# Patient Record
Sex: MALE | Race: WHITE | NOT HISPANIC OR LATINO | Employment: OTHER | RURAL
[De-identification: names, ages, dates, MRNs, and addresses within clinical notes are randomized per-mention and may not be internally consistent; named-entity substitution may affect disease eponyms.]

---

## 2020-10-01 ENCOUNTER — HISTORICAL (OUTPATIENT)
Dept: ADMINISTRATIVE | Facility: HOSPITAL | Age: 70
End: 2020-10-01

## 2020-10-02 LAB
ALT SERPL W P-5'-P-CCNC: 36 U/L (ref 16–61)
CHOLEST SERPL-MCNC: 175 MG/DL
CHOLEST/HDLC SERPL: 6 {RATIO}
HDLC SERPL-MCNC: 29 MG/DL
LDLC SERPL CALC-MCNC: 108 MG/DL
TRIGL SERPL-MCNC: 188 MG/DL

## 2021-03-16 RX ORDER — ATORVASTATIN CALCIUM 10 MG/1
10 TABLET, FILM COATED ORAL DAILY
COMMUNITY
End: 2021-12-07 | Stop reason: DRUGHIGH

## 2021-03-16 RX ORDER — LISINOPRIL AND HYDROCHLOROTHIAZIDE 12.5; 2 MG/1; MG/1
1 TABLET ORAL DAILY
COMMUNITY
End: 2022-04-19 | Stop reason: SDUPTHER

## 2021-03-16 RX ORDER — ASPIRIN 325 MG
325 TABLET, DELAYED RELEASE (ENTERIC COATED) ORAL DAILY
COMMUNITY

## 2021-03-16 RX ORDER — SERTRALINE HYDROCHLORIDE 100 MG/1
100 TABLET, FILM COATED ORAL DAILY
COMMUNITY
End: 2023-06-05 | Stop reason: SDUPTHER

## 2021-03-16 RX ORDER — HYDROXYZINE HYDROCHLORIDE 25 MG/1
25 TABLET, FILM COATED ORAL EVERY 8 HOURS PRN
COMMUNITY
End: 2023-06-05 | Stop reason: SDUPTHER

## 2021-03-16 RX ORDER — TERBINAFINE HYDROCHLORIDE 250 MG/1
250 TABLET ORAL DAILY
COMMUNITY

## 2021-03-30 ENCOUNTER — OFFICE VISIT (OUTPATIENT)
Dept: CARDIOLOGY | Facility: CLINIC | Age: 71
End: 2021-03-30
Payer: MEDICARE

## 2021-03-30 VITALS
DIASTOLIC BLOOD PRESSURE: 64 MMHG | HEIGHT: 66 IN | RESPIRATION RATE: 20 BRPM | WEIGHT: 203 LBS | BODY MASS INDEX: 32.62 KG/M2 | HEART RATE: 70 BPM | SYSTOLIC BLOOD PRESSURE: 118 MMHG

## 2021-03-30 DIAGNOSIS — J44.9 CHRONIC OBSTRUCTIVE PULMONARY DISEASE, UNSPECIFIED COPD TYPE: ICD-10-CM

## 2021-03-30 DIAGNOSIS — I48.91 ATRIAL FIBRILLATION, UNSPECIFIED TYPE: Primary | ICD-10-CM

## 2021-03-30 DIAGNOSIS — I25.10 CORONARY ARTERY DISEASE INVOLVING NATIVE HEART WITHOUT ANGINA PECTORIS, UNSPECIFIED VESSEL OR LESION TYPE: ICD-10-CM

## 2021-03-30 PROCEDURE — 93010 EKG 12-LEAD: ICD-10-PCS | Mod: S$PBB,,, | Performed by: INTERNAL MEDICINE

## 2021-03-30 PROCEDURE — 99214 OFFICE O/P EST MOD 30 MIN: CPT | Mod: PBBFAC,25 | Performed by: INTERNAL MEDICINE

## 2021-03-30 PROCEDURE — 99999 PR PBB SHADOW E&M-EST. PATIENT-LVL IV: CPT | Mod: PBBFAC,,, | Performed by: INTERNAL MEDICINE

## 2021-03-30 PROCEDURE — 99214 OFFICE O/P EST MOD 30 MIN: CPT | Mod: S$PBB,,, | Performed by: INTERNAL MEDICINE

## 2021-03-30 PROCEDURE — 99214 PR OFFICE/OUTPT VISIT, EST, LEVL IV, 30-39 MIN: ICD-10-PCS | Mod: S$PBB,,, | Performed by: INTERNAL MEDICINE

## 2021-03-30 PROCEDURE — 93010 ELECTROCARDIOGRAM REPORT: CPT | Mod: S$PBB,,, | Performed by: INTERNAL MEDICINE

## 2021-03-30 PROCEDURE — 99999 PR PBB SHADOW E&M-EST. PATIENT-LVL IV: ICD-10-PCS | Mod: PBBFAC,,, | Performed by: INTERNAL MEDICINE

## 2021-03-30 PROCEDURE — 93005 ELECTROCARDIOGRAM TRACING: CPT | Mod: PBBFAC | Performed by: INTERNAL MEDICINE

## 2021-03-31 PROBLEM — I25.10 CORONARY ARTERY DISEASE INVOLVING NATIVE HEART WITHOUT ANGINA PECTORIS: Status: ACTIVE | Noted: 2021-03-31

## 2021-03-31 PROBLEM — J44.9 CHRONIC OBSTRUCTIVE PULMONARY DISEASE: Status: ACTIVE | Noted: 2021-03-31

## 2021-03-31 PROBLEM — I48.91 ATRIAL FIBRILLATION: Status: ACTIVE | Noted: 2021-03-31

## 2021-10-25 ENCOUNTER — OFFICE VISIT (OUTPATIENT)
Dept: CARDIOLOGY | Facility: CLINIC | Age: 71
End: 2021-10-25
Payer: MEDICARE

## 2021-10-25 VITALS
SYSTOLIC BLOOD PRESSURE: 142 MMHG | HEIGHT: 66 IN | HEART RATE: 64 BPM | RESPIRATION RATE: 18 BRPM | BODY MASS INDEX: 31.34 KG/M2 | DIASTOLIC BLOOD PRESSURE: 80 MMHG | WEIGHT: 195 LBS

## 2021-10-25 DIAGNOSIS — F17.200 SMOKER: ICD-10-CM

## 2021-10-25 DIAGNOSIS — I48.0 PAROXYSMAL ATRIAL FIBRILLATION: Primary | ICD-10-CM

## 2021-10-25 DIAGNOSIS — I25.10 CORONARY ARTERY DISEASE INVOLVING NATIVE HEART WITHOUT ANGINA PECTORIS, UNSPECIFIED VESSEL OR LESION TYPE: ICD-10-CM

## 2021-10-25 DIAGNOSIS — I10 HYPERTENSION, ESSENTIAL: ICD-10-CM

## 2021-10-25 PROCEDURE — 99213 OFFICE O/P EST LOW 20 MIN: CPT | Mod: PBBFAC | Performed by: INTERNAL MEDICINE

## 2021-10-25 PROCEDURE — 99214 OFFICE O/P EST MOD 30 MIN: CPT | Mod: S$PBB,,, | Performed by: INTERNAL MEDICINE

## 2021-10-25 PROCEDURE — 99214 PR OFFICE/OUTPT VISIT, EST, LEVL IV, 30-39 MIN: ICD-10-PCS | Mod: S$PBB,,, | Performed by: INTERNAL MEDICINE

## 2021-10-25 PROCEDURE — 93005 ELECTROCARDIOGRAM TRACING: CPT | Mod: PBBFAC | Performed by: INTERNAL MEDICINE

## 2021-10-25 PROCEDURE — 93010 EKG 12-LEAD: ICD-10-PCS | Mod: S$PBB,,, | Performed by: INTERNAL MEDICINE

## 2021-10-25 PROCEDURE — 93010 ELECTROCARDIOGRAM REPORT: CPT | Mod: S$PBB,,, | Performed by: INTERNAL MEDICINE

## 2021-10-26 PROBLEM — F17.200 SMOKER: Status: ACTIVE | Noted: 2021-10-26

## 2021-10-26 PROBLEM — I10 HYPERTENSION, ESSENTIAL: Status: ACTIVE | Noted: 2021-10-26

## 2021-12-07 RX ORDER — ATORVASTATIN CALCIUM 20 MG/1
20 TABLET, FILM COATED ORAL DAILY
Qty: 90 TABLET | Refills: 3 | Status: SHIPPED | OUTPATIENT
Start: 2021-12-07 | End: 2023-12-14

## 2022-02-07 DIAGNOSIS — E78.5 HYPERLIPIDEMIA, UNSPECIFIED HYPERLIPIDEMIA TYPE: Primary | ICD-10-CM

## 2022-02-07 DIAGNOSIS — Z79.899 ENCOUNTER FOR LONG-TERM (CURRENT) USE OF OTHER MEDICATIONS: ICD-10-CM

## 2022-04-19 ENCOUNTER — OFFICE VISIT (OUTPATIENT)
Dept: CARDIOLOGY | Facility: CLINIC | Age: 72
End: 2022-04-19
Payer: MEDICARE

## 2022-04-19 VITALS
SYSTOLIC BLOOD PRESSURE: 126 MMHG | RESPIRATION RATE: 20 BRPM | HEART RATE: 70 BPM | DIASTOLIC BLOOD PRESSURE: 82 MMHG | WEIGHT: 187.5 LBS | BODY MASS INDEX: 28.42 KG/M2 | HEIGHT: 68 IN

## 2022-04-19 DIAGNOSIS — I25.10 CORONARY ARTERY DISEASE INVOLVING NATIVE CORONARY ARTERY OF NATIVE HEART WITHOUT ANGINA PECTORIS: Chronic | ICD-10-CM

## 2022-04-19 DIAGNOSIS — F41.9 ANXIETY: Chronic | ICD-10-CM

## 2022-04-19 DIAGNOSIS — I10 HYPERTENSION, ESSENTIAL: Chronic | ICD-10-CM

## 2022-04-19 DIAGNOSIS — J44.9 CHRONIC OBSTRUCTIVE PULMONARY DISEASE, UNSPECIFIED COPD TYPE: Chronic | ICD-10-CM

## 2022-04-19 DIAGNOSIS — I48.0 PAROXYSMAL ATRIAL FIBRILLATION: Primary | Chronic | ICD-10-CM

## 2022-04-19 PROCEDURE — 93010 EKG 12-LEAD: ICD-10-PCS | Mod: S$PBB,,, | Performed by: INTERNAL MEDICINE

## 2022-04-19 PROCEDURE — 93010 ELECTROCARDIOGRAM REPORT: CPT | Mod: S$PBB,,, | Performed by: INTERNAL MEDICINE

## 2022-04-19 PROCEDURE — 99214 OFFICE O/P EST MOD 30 MIN: CPT | Mod: S$PBB,,, | Performed by: INTERNAL MEDICINE

## 2022-04-19 PROCEDURE — 93005 ELECTROCARDIOGRAM TRACING: CPT | Mod: PBBFAC | Performed by: INTERNAL MEDICINE

## 2022-04-19 PROCEDURE — 99213 OFFICE O/P EST LOW 20 MIN: CPT | Mod: PBBFAC | Performed by: INTERNAL MEDICINE

## 2022-04-19 PROCEDURE — 99214 PR OFFICE/OUTPT VISIT, EST, LEVL IV, 30-39 MIN: ICD-10-PCS | Mod: S$PBB,,, | Performed by: INTERNAL MEDICINE

## 2022-04-19 RX ORDER — NITROGLYCERIN 0.4 MG/1
0.4 TABLET SUBLINGUAL EVERY 5 MIN PRN
Qty: 25 TABLET | Refills: 3 | Status: SHIPPED | OUTPATIENT
Start: 2022-04-19 | End: 2023-12-14 | Stop reason: SDUPTHER

## 2022-04-19 RX ORDER — LISINOPRIL AND HYDROCHLOROTHIAZIDE 12.5; 2 MG/1; MG/1
1 TABLET ORAL DAILY
Qty: 90 TABLET | Refills: 3 | Status: SHIPPED | OUTPATIENT
Start: 2022-04-19 | End: 2022-12-05 | Stop reason: SDUPTHER

## 2022-04-19 RX ORDER — TRAZODONE HYDROCHLORIDE 50 MG/1
50 TABLET ORAL NIGHTLY PRN
COMMUNITY
Start: 2022-01-21

## 2022-04-21 PROBLEM — F41.9 ANXIETY: Chronic | Status: ACTIVE | Noted: 2022-04-21

## 2022-04-21 NOTE — PROGRESS NOTES
[facility] note        DATE OF SERVICE: 04/21/2022       PCP: Marleny Andres II, MD      CHIEF COMPLAINT:   Chief Complaint   Patient presents with    Follow-up     6 Months; Denies any cardiac complaints.        HISTORY OF PRESENT ILLNESS:  Guanako Spivey is a 71 y.o. male with a PMH of   Past Medical History:   Diagnosis Date    Anxiety     Atrial fibrillation     During [facility]    COPD (chronic obstructive pulmonary disease)     Coronary artery disease     Hypertension     Mitral regurgitation      who presents for   Chief Complaint   Patient presents with    Follow-up     6 Months; Denies any cardiac complaints.          Review of Systems: Review of Systems   Respiratory: Negative for shortness of breath.    Cardiovascular: Negative for chest pain, palpitations and leg swelling.   Neurological: Negative for loss of consciousness.        PAST MEDICAL HISTORY:  Past Medical History:   Diagnosis Date    Anxiety     Atrial fibrillation     During [facility]    COPD (chronic obstructive pulmonary disease)     Coronary artery disease     Hypertension     Mitral regurgitation        PAST SURGICAL HISTORY:  Past Surgical History:   Procedure Laterality Date    CARDIAC CATHETERIZATION  01/2019    KNEE ARTHROSCOPY Right        SOCIAL HISTORY:  Social History     Socioeconomic History    Marital status:    Tobacco Use    Smoking status: Current Every Day Smoker     Packs/day: 1.50     Years: 50.00     Pack years: 75.00     Types: Cigarettes   Substance and Sexual Activity    Alcohol use: Never    Drug use: Never       FAMILY HISTORY:  Family History   Problem Relation Age of Onset    Stroke Mother     Heart disease Father     Tuberculosis Father     Heart attack Father          ALLERGIES:  Review of patient's allergies indicates:   Allergen Reactions    Codeine         MEDICATIONS:    Current Outpatient Medications:     aspirin (ECOTRIN) 325 MG EC tablet, Take 325 mg by mouth once  "daily., Disp: , Rfl:     atorvastatin (LIPITOR) 20 MG tablet, Take 1 tablet (20 mg total) by mouth once daily., Disp: 90 tablet, Rfl: 3    hydrOXYzine HCL (ATARAX) 25 MG tablet, Take 25 mg by mouth every 8 (eight) hours as needed for Itching., Disp: , Rfl:     sertraline (ZOLOFT) 100 MG tablet, Take 100 mg by mouth once daily., Disp: , Rfl:     traZODone (DESYREL) 50 MG tablet, Take 50 mg by mouth nightly as needed., Disp: , Rfl:     lisinopriL-hydrochlorothiazide (PRINZIDE,ZESTORETIC) 20-12.5 mg per tablet, Take 1 tablet by mouth once daily., Disp: 90 tablet, Rfl: 3    nitroGLYCERIN (NITROSTAT) 0.4 MG SL tablet, Place 1 tablet (0.4 mg total) under the tongue every 5 (five) minutes as needed for Chest pain., Disp: 25 tablet, Rfl: 3    terbinafine HCL (LAMISIL) 250 mg tablet, Take 250 mg by mouth once daily., Disp: , Rfl:      PHYSICAL EXAM:  /82 (BP Location: Left arm, Patient Position: Sitting)   Pulse 70   Resp 20   Ht 5' 8" (1.727 m)   Wt 85 kg (187 lb 8 oz)   BMI 28.51 kg/m²   Wt Readings from Last 3 Encounters:   04/19/22 85 kg (187 lb 8 oz)   10/25/21 88.5 kg (195 lb)   03/30/21 92.1 kg (203 lb)      Body mass index is 28.51 kg/m².    Physical Exam  Vitals reviewed.   Constitutional:       Appearance: Normal appearance.   HENT:      Head: Normocephalic and atraumatic.   Neck:      Vascular: No carotid bruit or JVD.   Cardiovascular:      Rate and Rhythm: Normal rate and regular rhythm.      Pulses: Normal pulses.           Radial pulses are 2+ on the right side and 2+ on the left side.        Dorsalis pedis pulses are 2+ on the right side and 2+ on the left side.      Heart sounds: Normal heart sounds.   Pulmonary:      Effort: Pulmonary effort is normal.      Comments: Prolonged expiratory phase  Musculoskeletal:      Right lower leg: No edema.      Left lower leg: No edema.   Skin:     General: Skin is warm and dry.   Neurological:      Mental Status: He is alert.         LABS REVIEWED:  No " results found for: WBC, RBC, HGB, HCT, MCV, MCH, MCHC, RDW, PLT, MPV, NRBC, INR  No results found for: NA, K, CL, CO2, BUN, MG, PHOS  Lab Results   Component Value Date    ALT 36 10/01/2020     No results found for: GLU, HGBA1C  Lab Results   Component Value Date    CHOL 175 10/01/2020    HDL 29 10/01/2020    TRIG 188 10/01/2020    CHOLHDL 6.0 10/01/2020       CARDIAC STUDIES REVIEWED:EKG: NSR, right atrial enlargement, pulmonary disease pattern, left anterior fascicular block, 72 bpm        ASSESSMENT:   Active Problem List with Overview Notes    Diagnosis Date Noted    Anxiety 04/21/2022    Smoker 10/26/2021    Hypertension, essential 10/26/2021    Atrial fibrillation 03/31/2021    Coronary artery disease involving native heart without angina pectoris 03/31/2021    Chronic obstructive pulmonary disease 03/31/2021     VISIT DIAGNOSIS:  Paroxysmal atrial fibrillation  Comments:  during [facility], CHAD2 = 1  Orders:  -     EKG 12-lead; Future    Coronary artery disease involving native coronary artery of native heart without angina pectoris    Hypertension, essential    Chronic obstructive pulmonary disease, unspecified COPD type    Anxiety    Other orders  -     lisinopriL-hydrochlorothiazide (PRINZIDE,ZESTORETIC) 20-12.5 mg per tablet; Take 1 tablet by mouth once daily.  Dispense: 90 tablet; Refill: 3  -     nitroGLYCERIN (NITROSTAT) 0.4 MG SL tablet; Place 1 tablet (0.4 mg total) under the tongue every 5 (five) minutes as needed for Chest pain.  Dispense: 25 tablet; Refill: 3         PLAN:  Orders Placed This Encounter   Procedures    EKG 12-lead     Standing Status:   Future     Number of Occurrences:   1     Standing Expiration Date:   4/19/2023      RTC 6 months.

## 2022-12-05 ENCOUNTER — OFFICE VISIT (OUTPATIENT)
Dept: CARDIOLOGY | Facility: CLINIC | Age: 72
End: 2022-12-05
Payer: MEDICARE

## 2022-12-05 VITALS
SYSTOLIC BLOOD PRESSURE: 148 MMHG | OXYGEN SATURATION: 96 % | HEIGHT: 68 IN | WEIGHT: 199.19 LBS | DIASTOLIC BLOOD PRESSURE: 80 MMHG | HEART RATE: 81 BPM | BODY MASS INDEX: 30.19 KG/M2

## 2022-12-05 DIAGNOSIS — I48.91 ATRIAL FIBRILLATION, UNSPECIFIED TYPE: Primary | ICD-10-CM

## 2022-12-05 DIAGNOSIS — I10 HYPERTENSION, UNSPECIFIED TYPE: ICD-10-CM

## 2022-12-05 PROCEDURE — 93005 ELECTROCARDIOGRAM TRACING: CPT | Mod: PBBFAC | Performed by: INTERNAL MEDICINE

## 2022-12-05 PROCEDURE — 99213 OFFICE O/P EST LOW 20 MIN: CPT | Mod: PBBFAC | Performed by: NURSE PRACTITIONER

## 2022-12-05 PROCEDURE — 99214 PR OFFICE/OUTPT VISIT, EST, LEVL IV, 30-39 MIN: ICD-10-PCS | Mod: S$PBB,,, | Performed by: NURSE PRACTITIONER

## 2022-12-05 PROCEDURE — 99214 OFFICE O/P EST MOD 30 MIN: CPT | Mod: S$PBB,,, | Performed by: NURSE PRACTITIONER

## 2022-12-05 PROCEDURE — 93010 ELECTROCARDIOGRAM REPORT: CPT | Mod: S$PBB,,, | Performed by: INTERNAL MEDICINE

## 2022-12-05 PROCEDURE — 93010 EKG 12-LEAD: ICD-10-PCS | Mod: S$PBB,,, | Performed by: INTERNAL MEDICINE

## 2022-12-05 RX ORDER — LISINOPRIL AND HYDROCHLOROTHIAZIDE 12.5; 2 MG/1; MG/1
1 TABLET ORAL DAILY
Qty: 90 TABLET | Refills: 1 | Status: SHIPPED | OUTPATIENT
Start: 2022-12-05 | End: 2023-12-14

## 2022-12-05 NOTE — PROGRESS NOTES
[facility] note        DATE OF SERVICE: 12/05/2022       PCP: Marleny Andres II, MD      CHIEF COMPLAINT:   Chief Complaint   Patient presents with    Follow-up     Patient denies any cardiac complaints today.        HISTORY OF PRESENT ILLNESS:  Guanako Spivey is a 72 y.o. male with a PMH of   Past Medical History:   Diagnosis Date    Anxiety     Atrial fibrillation     During [facility]    COPD (chronic obstructive pulmonary disease)     Coronary artery disease     Hypertension     Mitral regurgitation      who presents for routine follow up.   Chief Complaint   Patient presents with    Follow-up     Patient denies any cardiac complaints today.          Review of Systems: Review of Systems   Respiratory:  Negative for shortness of breath.    Cardiovascular:  Negative for chest pain, palpitations and leg swelling.   Neurological:  Negative for loss of consciousness.      PAST MEDICAL HISTORY:  Past Medical History:   Diagnosis Date    Anxiety     Atrial fibrillation     During [facility]    COPD (chronic obstructive pulmonary disease)     Coronary artery disease     Hypertension     Mitral regurgitation        PAST SURGICAL HISTORY:  Past Surgical History:   Procedure Laterality Date    CARDIAC CATHETERIZATION  01/2019    KNEE ARTHROSCOPY Right        SOCIAL HISTORY:  Social History     Socioeconomic History    Marital status:    Tobacco Use    Smoking status: Every Day     Packs/day: 1.50     Years: 50.00     Pack years: 75.00     Types: Cigarettes   Substance and Sexual Activity    Alcohol use: Never    Drug use: Never       FAMILY HISTORY:  Family History   Problem Relation Age of Onset    Stroke Mother     Heart disease Father     Tuberculosis Father     Heart attack Father          ALLERGIES:  Review of patient's allergies indicates:   Allergen Reactions    Codeine         MEDICATIONS:    Current Outpatient Medications:     aspirin (ECOTRIN) 325 MG EC tablet, Take 325 mg by mouth once daily., Disp: ,  "Rfl:     hydrOXYzine HCL (ATARAX) 25 MG tablet, Take 25 mg by mouth every 8 (eight) hours as needed for Itching., Disp: , Rfl:     nitroGLYCERIN (NITROSTAT) 0.4 MG SL tablet, Place 1 tablet (0.4 mg total) under the tongue every 5 (five) minutes as needed for Chest pain., Disp: 25 tablet, Rfl: 3    sertraline (ZOLOFT) 100 MG tablet, Take 100 mg by mouth once daily., Disp: , Rfl:     atorvastatin (LIPITOR) 20 MG tablet, Take 1 tablet (20 mg total) by mouth once daily. (Patient not taking: Reported on 12/5/2022), Disp: 90 tablet, Rfl: 3    lisinopriL-hydrochlorothiazide (PRINZIDE,ZESTORETIC) 20-12.5 mg per tablet, Take 1 tablet by mouth once daily., Disp: 90 tablet, Rfl: 1    terbinafine HCL (LAMISIL) 250 mg tablet, Take 250 mg by mouth once daily., Disp: , Rfl:     traZODone (DESYREL) 50 MG tablet, Take 50 mg by mouth nightly as needed., Disp: , Rfl:    Meds reviewed and reconciled  PHYSICAL EXAM:  BP (!) 148/80 (BP Location: Left arm, Patient Position: Sitting)   Pulse 81   Ht 5' 8" (1.727 m)   Wt 90.4 kg (199 lb 3.2 oz)   SpO2 96%   BMI 30.29 kg/m²   Wt Readings from Last 3 Encounters:   12/05/22 90.4 kg (199 lb 3.2 oz)   04/19/22 85 kg (187 lb 8 oz)   10/25/21 88.5 kg (195 lb)      Body mass index is 30.29 kg/m².    Physical Exam  Vitals reviewed.   Constitutional:       Appearance: Normal appearance.   HENT:      Head: Normocephalic and atraumatic.   Neck:      Vascular: No carotid bruit or JVD.   Cardiovascular:      Rate and Rhythm: Normal rate and regular rhythm.      Pulses: Normal pulses.           Radial pulses are 2+ on the right side and 2+ on the left side.        Dorsalis pedis pulses are 2+ on the right side and 2+ on the left side.      Heart sounds: Normal heart sounds.   Pulmonary:      Effort: Pulmonary effort is normal.      Comments: Prolonged expiratory phase  Musculoskeletal:      Right lower leg: No edema.      Left lower leg: No edema.   Skin:     General: Skin is warm and dry. "   Neurological:      Mental Status: He is alert.       LABS REVIEWED:  No results found for: WBC, RBC, HGB, HCT, MCV, MCH, MCHC, RDW, PLT, MPV, NRBC, INR  No results found for: NA, K, CL, CO2, BUN, MG, PHOS  Lab Results   Component Value Date    ALT 36 10/01/2020     No results found for: GLU, HGBA1C  Lab Results   Component Value Date    CHOL 175 10/01/2020    HDL 29 10/01/2020    TRIG 188 10/01/2020    CHOLHDL 6.0 10/01/2020       CARDIAC STUDIES REVIEWED:[id number] RSR with HR 73 bpm  2022 NSR, right atrial enlargement, pulmonary disease pattern, left anterior fascicular block, 72 bpm    2018  Moderate sized inferoapical wall partially reperfusing defect suspicious for ischemia and probable scar  A subtle, moderate to large sized area of mid anterolateral wall demonstrates improvement in rest images and may represent an area of ischemia  Normal LV size and systolic function,EF 76%    Echocardiogram 2018  Mild concentric LVH with normal LV size and systolic function, EF 60%  Left ventricular diastolic dysfunction (impaired LV relaxation)  Mild mitral regurgitation with normal LA size  Aortic valve sclerosis w/o significant stenosis  Mild TR with o/w normal right heart chambers and valves    LHC 2019  Mild to moderate, nonobstructive CAD with a 60% mid LAD lesion  Normal LV size and systolic function, EF 65%  Mild MR  The patient was observed to have paroxysmal atrial fibrillation in the Cath lab. This may be another source for his chest discomfort.      ASSESSMENT:   Active Problem List with Overview Notes    Diagnosis Date Noted    Anxiety 2022    Smoker 10/26/2021    Hypertension, essential 10/26/2021    Atrial fibrillation 2021    Coronary artery disease involving native heart without angina pectoris 2021    Chronic obstructive pulmonary disease 2021     VISIT DIAGNOSIS:  Atrial fibrillation, unspecified type  -     EKG 12-lead; Future    Hypertension,  unspecified type  -     lisinopriL-hydrochlorothiazide (PRINZIDE,ZESTORETIC) 20-12.5 mg per tablet; Take 1 tablet by mouth once daily.  Dispense: 90 tablet; Refill: 1       PLAN: CHADS2 score is 1;   Orders Placed This Encounter   Procedures    EKG 12-lead     Standing Status:   Future     Standing Expiration Date:   12/5/2023      RTC 6 months.

## 2023-06-05 ENCOUNTER — OFFICE VISIT (OUTPATIENT)
Dept: CARDIOLOGY | Facility: CLINIC | Age: 73
End: 2023-06-05
Payer: MEDICARE

## 2023-06-05 VITALS
HEIGHT: 68 IN | SYSTOLIC BLOOD PRESSURE: 158 MMHG | BODY MASS INDEX: 30.37 KG/M2 | HEART RATE: 76 BPM | WEIGHT: 200.38 LBS | RESPIRATION RATE: 18 BRPM | DIASTOLIC BLOOD PRESSURE: 94 MMHG

## 2023-06-05 DIAGNOSIS — I48.0 PAROXYSMAL ATRIAL FIBRILLATION: Primary | Chronic | ICD-10-CM

## 2023-06-05 DIAGNOSIS — I25.10 CORONARY ARTERY DISEASE INVOLVING NATIVE CORONARY ARTERY OF NATIVE HEART WITHOUT ANGINA PECTORIS: Chronic | ICD-10-CM

## 2023-06-05 DIAGNOSIS — J44.9 CHRONIC OBSTRUCTIVE PULMONARY DISEASE, UNSPECIFIED COPD TYPE: Chronic | ICD-10-CM

## 2023-06-05 DIAGNOSIS — I10 HYPERTENSION, ESSENTIAL: Chronic | ICD-10-CM

## 2023-06-05 PROCEDURE — 99214 OFFICE O/P EST MOD 30 MIN: CPT | Mod: S$PBB,,, | Performed by: INTERNAL MEDICINE

## 2023-06-05 PROCEDURE — 93010 EKG 12-LEAD: ICD-10-PCS | Mod: S$PBB,,, | Performed by: INTERNAL MEDICINE

## 2023-06-05 PROCEDURE — 99214 PR OFFICE/OUTPT VISIT, EST, LEVL IV, 30-39 MIN: ICD-10-PCS | Mod: S$PBB,,, | Performed by: INTERNAL MEDICINE

## 2023-06-05 PROCEDURE — 93005 ELECTROCARDIOGRAM TRACING: CPT | Mod: PBBFAC | Performed by: INTERNAL MEDICINE

## 2023-06-05 PROCEDURE — 99213 OFFICE O/P EST LOW 20 MIN: CPT | Mod: PBBFAC | Performed by: INTERNAL MEDICINE

## 2023-06-05 PROCEDURE — 93010 ELECTROCARDIOGRAM REPORT: CPT | Mod: S$PBB,,, | Performed by: INTERNAL MEDICINE

## 2023-06-05 RX ORDER — HYDROXYZINE HYDROCHLORIDE 25 MG/1
25 TABLET, FILM COATED ORAL EVERY 8 HOURS PRN
Qty: 30 TABLET | Refills: 1 | Status: SHIPPED | OUTPATIENT
Start: 2023-06-05 | End: 2023-08-04

## 2023-06-05 RX ORDER — SERTRALINE HYDROCHLORIDE 100 MG/1
100 TABLET, FILM COATED ORAL DAILY
Qty: 30 TABLET | Refills: 1 | Status: SHIPPED | OUTPATIENT
Start: 2023-06-05 | End: 2023-12-14

## 2023-06-12 NOTE — PROGRESS NOTES
PCP: Marleny Andres II, MD    Referring Provider:     Subjective:   Guanako Spivey is a 72 y.o. male with hx of nonobstructive CAD, HTN, COPD, and paroxysmal afib (during [facility]) who presents for 6 month follow up.     Fhx:  Family History   Problem Relation Age of Onset    Stroke Mother     Heart disease Father     Tuberculosis Father     Heart attack Father      Shx:   Social History     Socioeconomic History    Marital status:    Tobacco Use    Smoking status: Every Day     Packs/day: 1.50     Years: 50.00     Pack years: 75.00     Types: Cigarettes   Substance and Sexual Activity    Alcohol use: Never    Drug use: Never       EKG   6/5/23--NSR, possible left atrial enlargement, left anterior fascicular block, junctional ST depression, 77 bpm  12/5/2022 RSR with HR 73 bpm  4/19/2022 NSR, right atrial enlargement, pulmonary disease pattern, left anterior fascicular block, 72 bpm    ECHO:  12/6/2018  Moderate sized inferoapical wall partially reperfusing defect suspicious for ischemia and probable scar  A subtle, moderate to large sized area of mid anterolateral wall demonstrates improvement in rest images and may represent an area of ischemia  Normal LV size and systolic function,EF 76%     Echocardiogram 11/26/2018  Mild concentric LVH with normal LV size and systolic function, EF 60%  Left ventricular diastolic dysfunction (impaired LV relaxation)  Mild mitral regurgitation with normal LA size  Aortic valve sclerosis w/o significant stenosis  Mild TR with o/w normal right heart chambers and valve      LHC:  1/24/2019  Mild to moderate, nonobstructive CAD with a 60% mid LAD lesion  Normal LV size and systolic function, EF 65%  Mild MR  The patient was observed to have paroxysmal atrial fibrillation in the Cath lab. This may be another source for his chest discomfort.    Lab Results   Component Value Date    CHOL 175 10/01/2020     Lab Results   Component Value Date    HDL 29 10/01/2020     Lab Results  "  Component Value Date    LDLCALC 108 10/01/2020     Lab Results   Component Value Date    TRIG 188 10/01/2020     Lab Results   Component Value Date    CHOLHDL 6.0 10/01/2020         Current Outpatient Medications:     aspirin (ECOTRIN) 325 MG EC tablet, Take 325 mg by mouth once daily., Disp: , Rfl:     atorvastatin (LIPITOR) 20 MG tablet, Take 1 tablet (20 mg total) by mouth once daily., Disp: 90 tablet, Rfl: 3    lisinopriL-hydrochlorothiazide (PRINZIDE,ZESTORETIC) 20-12.5 mg per tablet, Take 1 tablet by mouth once daily., Disp: 90 tablet, Rfl: 1    hydrOXYzine HCL (ATARAX) 25 MG tablet, Take 1 tablet (25 mg total) by mouth every 8 (eight) hours as needed for Itching., Disp: 30 tablet, Rfl: 1    nitroGLYCERIN (NITROSTAT) 0.4 MG SL tablet, Place 1 tablet (0.4 mg total) under the tongue every 5 (five) minutes as needed for Chest pain., Disp: 25 tablet, Rfl: 3    sertraline (ZOLOFT) 100 MG tablet, Take 1 tablet (100 mg total) by mouth once daily., Disp: 30 tablet, Rfl: 1    terbinafine HCL (LAMISIL) 250 mg tablet, Take 250 mg by mouth once daily., Disp: , Rfl:     traZODone (DESYREL) 50 MG tablet, Take 50 mg by mouth nightly as needed., Disp: , Rfl:   Medications reviewed and reconciled.    Review of Systems   Respiratory:  Negative for shortness of breath.    Cardiovascular:  Negative for chest pain, palpitations and leg swelling.   Neurological:  Negative for loss of consciousness.         Objective:   BP (!) 158/94 (BP Location: Left arm, Patient Position: Sitting)   Pulse 76   Resp 18   Ht 5' 8" (1.727 m)   Wt 90.9 kg (200 lb 6.4 oz)   BMI 30.47 kg/m²     Physical Exam  Vitals reviewed.   Constitutional:       Appearance: Normal appearance.   HENT:      Head: Normocephalic and atraumatic.   Neck:      Vascular: No carotid bruit or JVD.   Cardiovascular:      Rate and Rhythm: Normal rate and regular rhythm.      Pulses: Normal pulses.           Radial pulses are 2+ on the right side and 2+ on the left side. "        Dorsalis pedis pulses are 2+ on the right side and 2+ on the left side.      Heart sounds: Normal heart sounds. No murmur heard.  Pulmonary:      Effort: Pulmonary effort is normal.      Breath sounds: Decreased breath sounds present.   Musculoskeletal:      Right lower leg: No edema.      Left lower leg: No edema.   Skin:     General: Skin is dry.   Neurological:      Mental Status: He is alert and oriented to person, place, and time.         Assessment:     1. Paroxysmal atrial fibrillation  EKG 12-lead    EKG 12-lead    during [facility]      2. Hypertension, essential        3. Chronic obstructive pulmonary disease, unspecified COPD type        4. Coronary artery disease involving native coronary artery of native heart without angina pectoris      nonobstructive            Plan:   Continue current medications  Refill rx's  F/u in 6 months.

## 2023-12-14 ENCOUNTER — OFFICE VISIT (OUTPATIENT)
Dept: CARDIOLOGY | Facility: CLINIC | Age: 73
End: 2023-12-14
Payer: MEDICARE

## 2023-12-14 VITALS
HEIGHT: 68 IN | DIASTOLIC BLOOD PRESSURE: 100 MMHG | OXYGEN SATURATION: 95 % | SYSTOLIC BLOOD PRESSURE: 140 MMHG | WEIGHT: 189.81 LBS | HEART RATE: 79 BPM | BODY MASS INDEX: 28.77 KG/M2

## 2023-12-14 DIAGNOSIS — I10 HYPERTENSION, ESSENTIAL: Chronic | ICD-10-CM

## 2023-12-14 DIAGNOSIS — J44.9 CHRONIC OBSTRUCTIVE PULMONARY DISEASE, UNSPECIFIED COPD TYPE: Chronic | ICD-10-CM

## 2023-12-14 DIAGNOSIS — I48.0 PAROXYSMAL ATRIAL FIBRILLATION: Chronic | ICD-10-CM

## 2023-12-14 DIAGNOSIS — I25.10 CORONARY ARTERY DISEASE INVOLVING NATIVE CORONARY ARTERY OF NATIVE HEART WITHOUT ANGINA PECTORIS: Primary | Chronic | ICD-10-CM

## 2023-12-14 PROCEDURE — 93010 ELECTROCARDIOGRAM REPORT: CPT | Mod: S$PBB,,, | Performed by: INTERNAL MEDICINE

## 2023-12-14 PROCEDURE — 99213 OFFICE O/P EST LOW 20 MIN: CPT | Mod: PBBFAC | Performed by: INTERNAL MEDICINE

## 2023-12-14 PROCEDURE — 99214 PR OFFICE/OUTPT VISIT, EST, LEVL IV, 30-39 MIN: ICD-10-PCS | Mod: S$PBB,,, | Performed by: INTERNAL MEDICINE

## 2023-12-14 PROCEDURE — 93005 ELECTROCARDIOGRAM TRACING: CPT | Mod: PBBFAC | Performed by: INTERNAL MEDICINE

## 2023-12-14 PROCEDURE — 93010 EKG 12-LEAD: ICD-10-PCS | Mod: S$PBB,,, | Performed by: INTERNAL MEDICINE

## 2023-12-14 PROCEDURE — 99214 OFFICE O/P EST MOD 30 MIN: CPT | Mod: S$PBB,,, | Performed by: INTERNAL MEDICINE

## 2023-12-14 RX ORDER — HYDROXYZINE HYDROCHLORIDE 25 MG/1
25 TABLET, FILM COATED ORAL EVERY 8 HOURS
Qty: 60 TABLET | Refills: 5 | Status: SHIPPED | OUTPATIENT
Start: 2023-12-14

## 2023-12-14 RX ORDER — NITROGLYCERIN 0.4 MG/1
0.4 TABLET SUBLINGUAL EVERY 5 MIN PRN
Qty: 25 TABLET | Refills: 3 | Status: SHIPPED | OUTPATIENT
Start: 2023-12-14 | End: 2024-12-13

## 2023-12-14 RX ORDER — HYDROXYZINE HYDROCHLORIDE 25 MG/1
25 TABLET, FILM COATED ORAL EVERY 8 HOURS
COMMUNITY
Start: 2023-12-04 | End: 2023-12-14 | Stop reason: SDUPTHER

## 2023-12-19 NOTE — PROGRESS NOTES
PCP: Marleny Yusuf II, MD    Referring Provider:     Subjective:   Guanako Spivey is a 73 y.o. male with hx of nonobstructive CAD, HTN, COPD, and paroxysmal afib (during [facility]) who presents for 6 month follow up.     6/5/23--Guanako Spivey is a 73 y.o. male with hx of nonobstructive CAD, HTN, COPD, and paroxysmal afib (during [facility]) who presents for 6 month follow up.     Fhx:  Family History   Problem Relation Age of Onset    Stroke Mother     Heart disease Father     Tuberculosis Father     Heart attack Father      Shx:   Social History     Socioeconomic History    Marital status:    Tobacco Use    Smoking status: Every Day     Current packs/day: 1.50     Average packs/day: 1.5 packs/day for 50.0 years (75.0 ttl pk-yrs)     Types: Cigarettes   Substance and Sexual Activity    Alcohol use: Never    Drug use: Never       EKG   12/14/23--NSR, right atrial enlargement, left axis deviation, pulmonary disease pattern, minimal voltage criteria for LVH, nonspecific ST and T wave abn, 77 bpm  6/5/23--NSR, possible left atrial enlargement, left anterior fascicular block, junctional ST depression, 77 bpm  12/5/2022 RSR with HR 73 bpm  4/19/2022 NSR, right atrial enlargement, pulmonary disease pattern, left anterior fascicular block, 72 bpm    ECHO:  12/6/2018  Moderate sized inferoapical wall partially reperfusing defect suspicious for ischemia and probable scar  A subtle, moderate to large sized area of mid anterolateral wall demonstrates improvement in rest images and may represent an area of ischemia  Normal LV size and systolic function,EF 76%     Echocardiogram 11/26/2018  Mild concentric LVH with normal LV size and systolic function, EF 60%  Left ventricular diastolic dysfunction (impaired LV relaxation)  Mild mitral regurgitation with normal LA size  Aortic valve sclerosis w/o significant stenosis  Mild TR with o/w normal right heart chambers and valve      LHC:  1/24/2019  Mild to moderate, nonobstructive CAD  "with a 60% mid LAD lesion  Normal LV size and systolic function, EF 65%  Mild MR  The patient was observed to have paroxysmal atrial fibrillation in the Cath lab. This may be another source for his chest discomfort.    Lab Results   Component Value Date    CHOL 175 10/01/2020     Lab Results   Component Value Date    HDL 29 10/01/2020     Lab Results   Component Value Date    LDLCALC 108 10/01/2020     Lab Results   Component Value Date    TRIG 188 10/01/2020     Lab Results   Component Value Date    CHOLHDL 6.0 10/01/2020         Current Outpatient Medications:     aspirin (ECOTRIN) 325 MG EC tablet, Take 325 mg by mouth once daily., Disp: , Rfl:     atorvastatin (LIPITOR) 20 MG tablet, Take 1 tablet (20 mg total) by mouth once daily., Disp: 90 tablet, Rfl: 3    lisinopriL-hydrochlorothiazide (PRINZIDE,ZESTORETIC) 20-12.5 mg per tablet, Take 1 tablet by mouth once daily., Disp: 90 tablet, Rfl: 1    sertraline (ZOLOFT) 100 MG tablet, Take 1 tablet (100 mg total) by mouth once daily., Disp: 30 tablet, Rfl: 1    hydrOXYzine HCL (ATARAX) 25 MG tablet, Take 1 tablet (25 mg total) by mouth every 8 (eight) hours., Disp: 60 tablet, Rfl: 5    nitroGLYCERIN (NITROSTAT) 0.4 MG SL tablet, Place 1 tablet (0.4 mg total) under the tongue every 5 (five) minutes as needed for Chest pain., Disp: 25 tablet, Rfl: 3    terbinafine HCL (LAMISIL) 250 mg tablet, Take 250 mg by mouth once daily., Disp: , Rfl:     traZODone (DESYREL) 50 MG tablet, Take 50 mg by mouth nightly as needed., Disp: , Rfl:   Medications reviewed and reconciled.    Review of Systems   Respiratory:  Negative for shortness of breath.    Cardiovascular:  Negative for chest pain, palpitations and leg swelling.   Neurological:  Negative for loss of consciousness.           Objective:   BP (!) 140/100 (BP Location: Left arm, Patient Position: Sitting)   Pulse 79   Ht 5' 8" (1.727 m)   Wt 86.1 kg (189 lb 12.8 oz)   SpO2 95%   BMI 28.86 kg/m²     Physical Exam  Vitals " reviewed.   Constitutional:       Appearance: Normal appearance.   HENT:      Head: Normocephalic and atraumatic.   Neck:      Vascular: No carotid bruit or JVD.   Cardiovascular:      Rate and Rhythm: Normal rate and regular rhythm.      Pulses: Normal pulses.           Radial pulses are 2+ on the right side and 2+ on the left side.        Dorsalis pedis pulses are 2+ on the right side and 2+ on the left side.      Heart sounds: Normal heart sounds. No murmur heard.  Pulmonary:      Effort: Pulmonary effort is normal.      Breath sounds: Decreased breath sounds present.   Musculoskeletal:      Right lower leg: No edema.      Left lower leg: No edema.   Skin:     General: Skin is dry.   Neurological:      Mental Status: He is alert and oriented to person, place, and time.           Assessment:     1. Coronary artery disease involving native coronary artery of native heart without angina pectoris        2. Paroxysmal atrial fibrillation  EKG 12-lead    EKG 12-lead      3. Chronic obstructive pulmonary disease, unspecified COPD type        4. Hypertension, essential              Plan:   Continue current medications  Refill rx's  F/u in 6 months.

## 2024-06-20 ENCOUNTER — OFFICE VISIT (OUTPATIENT)
Dept: CARDIOLOGY | Facility: CLINIC | Age: 74
End: 2024-06-20
Payer: MEDICARE

## 2024-06-20 VITALS
WEIGHT: 191.81 LBS | OXYGEN SATURATION: 95 % | HEART RATE: 97 BPM | SYSTOLIC BLOOD PRESSURE: 118 MMHG | BODY MASS INDEX: 29.07 KG/M2 | DIASTOLIC BLOOD PRESSURE: 78 MMHG | HEIGHT: 68 IN

## 2024-06-20 DIAGNOSIS — I10 HYPERTENSION, UNSPECIFIED TYPE: Chronic | ICD-10-CM

## 2024-06-20 DIAGNOSIS — I25.10 CORONARY ARTERY DISEASE INVOLVING NATIVE CORONARY ARTERY OF NATIVE HEART WITHOUT ANGINA PECTORIS: Chronic | ICD-10-CM

## 2024-06-20 DIAGNOSIS — I48.0 PAROXYSMAL ATRIAL FIBRILLATION: Primary | Chronic | ICD-10-CM

## 2024-06-20 DIAGNOSIS — J44.9 CHRONIC OBSTRUCTIVE PULMONARY DISEASE, UNSPECIFIED COPD TYPE: Chronic | ICD-10-CM

## 2024-06-20 PROCEDURE — 93010 ELECTROCARDIOGRAM REPORT: CPT | Mod: S$PBB,,, | Performed by: INTERNAL MEDICINE

## 2024-06-20 PROCEDURE — 99213 OFFICE O/P EST LOW 20 MIN: CPT | Mod: PBBFAC,25 | Performed by: INTERNAL MEDICINE

## 2024-06-20 PROCEDURE — 93005 ELECTROCARDIOGRAM TRACING: CPT | Mod: PBBFAC | Performed by: INTERNAL MEDICINE

## 2024-06-20 PROCEDURE — 99214 OFFICE O/P EST MOD 30 MIN: CPT | Mod: S$PBB,,, | Performed by: INTERNAL MEDICINE

## 2024-06-20 PROCEDURE — 99999 PR PBB SHADOW E&M-EST. PATIENT-LVL III: CPT | Mod: PBBFAC,,, | Performed by: INTERNAL MEDICINE

## 2024-06-20 RX ORDER — LISINOPRIL AND HYDROCHLOROTHIAZIDE 12.5; 2 MG/1; MG/1
1 TABLET ORAL DAILY
Qty: 90 TABLET | Refills: 3 | Status: SHIPPED | OUTPATIENT
Start: 2024-06-20 | End: 2025-06-20

## 2024-06-21 LAB
OHS QRS DURATION: 100 MS
OHS QTC CALCULATION: 474 MS
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
## 2025-01-10 ENCOUNTER — OFFICE VISIT (OUTPATIENT)
Dept: CARDIOLOGY | Facility: CLINIC | Age: 75
End: 2025-01-10
Payer: MEDICARE

## 2025-01-10 VITALS
SYSTOLIC BLOOD PRESSURE: 132 MMHG | OXYGEN SATURATION: 95 % | HEIGHT: 68 IN | WEIGHT: 190.81 LBS | HEART RATE: 77 BPM | DIASTOLIC BLOOD PRESSURE: 70 MMHG | BODY MASS INDEX: 28.92 KG/M2

## 2025-01-10 DIAGNOSIS — I25.10 CORONARY ARTERY DISEASE INVOLVING NATIVE CORONARY ARTERY OF NATIVE HEART WITHOUT ANGINA PECTORIS: Primary | Chronic | ICD-10-CM

## 2025-01-10 DIAGNOSIS — I10 HYPERTENSION, UNSPECIFIED TYPE: Chronic | ICD-10-CM

## 2025-01-10 DIAGNOSIS — J44.9 CHRONIC OBSTRUCTIVE PULMONARY DISEASE, UNSPECIFIED COPD TYPE: Chronic | ICD-10-CM

## 2025-01-10 DIAGNOSIS — I48.0 PAROXYSMAL ATRIAL FIBRILLATION: Chronic | ICD-10-CM

## 2025-01-10 PROCEDURE — 93005 ELECTROCARDIOGRAM TRACING: CPT | Mod: PBBFAC | Performed by: INTERNAL MEDICINE

## 2025-01-10 PROCEDURE — 99213 OFFICE O/P EST LOW 20 MIN: CPT | Mod: PBBFAC,25 | Performed by: INTERNAL MEDICINE

## 2025-01-10 PROCEDURE — 99214 OFFICE O/P EST MOD 30 MIN: CPT | Mod: S$PBB,,, | Performed by: INTERNAL MEDICINE

## 2025-01-10 PROCEDURE — 93010 ELECTROCARDIOGRAM REPORT: CPT | Mod: S$PBB,,, | Performed by: INTERNAL MEDICINE

## 2025-01-10 PROCEDURE — 99999 PR PBB SHADOW E&M-EST. PATIENT-LVL III: CPT | Mod: PBBFAC,,, | Performed by: INTERNAL MEDICINE

## 2025-01-10 RX ORDER — SERTRALINE HYDROCHLORIDE 100 MG/1
100 TABLET, FILM COATED ORAL DAILY
Qty: 30 TABLET | Refills: 1 | Status: SHIPPED | OUTPATIENT
Start: 2025-01-10 | End: 2025-03-11

## 2025-01-10 RX ORDER — HYDROXYZINE HYDROCHLORIDE 25 MG/1
25 TABLET, FILM COATED ORAL EVERY 8 HOURS
Qty: 60 TABLET | Refills: 5 | Status: SHIPPED | OUTPATIENT
Start: 2025-01-10

## 2025-01-14 NOTE — PROGRESS NOTES
PCP: Marleny Yusuf II, MD    Referring Provider:     Subjective:   Guanako Spivey is a 74 y.o. male with hx of nonobstructive CAD, HTN, COPD, and paroxysmal afib (during [facility]) who presents for 6 month follow up.     6/20/24--Guanako Spivey is a 73 y.o. male with hx of nonobstructive CAD, HTN, COPD, and paroxysmal afib (during [facility]) who presents for 6 month follow up.     12/14/23--Guanako Spivey is a 73 y.o. male with hx of nonobstructive CAD, HTN, COPD, and paroxysmal afib (during [facility]) who presents for 6 month follow up.     6/5/23--Guanako Spivey is a 74 y.o. male with hx of nonobstructive CAD, HTN, COPD, and paroxysmal afib (during [facility]) who presents for 6 month follow up.     Fhx:  Family History   Problem Relation Name Age of Onset    Stroke Mother      Heart disease Father      Tuberculosis Father      Heart attack Father       Shx:   Social History     Socioeconomic History    Marital status:    Tobacco Use    Smoking status: Every Day     Current packs/day: 1.50     Average packs/day: 1.5 packs/day for 50.0 years (75.0 ttl pk-yrs)     Types: Cigarettes   Substance and Sexual Activity    Alcohol use: Never    Drug use: Never       EKG   1/10/25--NSR, left anterior fascicular block, minimal criteria for LVH, ST and T wave abn, 68 bpm  6/20/24--NSR with sinus arrhythmia, right atrial enlargement, left axis deviation, ,minimal voltage criteria for LVH, 99 bpm  12/14/23--NSR, right atrial enlargement, left axis deviation, pulmonary disease pattern, minimal voltage criteria for LVH, nonspecific ST and T wave abn, 77 bpm  6/5/23--NSR, possible left atrial enlargement, left anterior fascicular block, junctional ST depression, 77 bpm  12/5/2022 RSR with HR 73 bpm  4/19/2022 NSR, right atrial enlargement, pulmonary disease pattern, left anterior fascicular block, 72 bpm    ECHO:  12/6/2018  Moderate sized inferoapical wall partially reperfusing defect suspicious for ischemia and probable scar  A subtle, moderate to  large sized area of mid anterolateral wall demonstrates improvement in rest images and may represent an area of ischemia  Normal LV size and systolic function,EF 76%     Echocardiogram 11/26/2018  Mild concentric LVH with normal LV size and systolic function, EF 60%  Left ventricular diastolic dysfunction (impaired LV relaxation)  Mild mitral regurgitation with normal LA size  Aortic valve sclerosis w/o significant stenosis  Mild TR with o/w normal right heart chambers and valve      [facility]:  1/24/2019  Mild to moderate, nonobstructive CAD with a 60% mid LAD lesion  Normal LV size and systolic function, EF 65%  Mild MR  The patient was observed to have paroxysmal atrial fibrillation in the Cath lab. This may be another source for his chest discomfort.    Lab Results   Component Value Date    CHOL 175 10/01/2020     Lab Results   Component Value Date    HDL 29 10/01/2020     Lab Results   Component Value Date    LDLCALC 108 10/01/2020     Lab Results   Component Value Date    TRIG 188 10/01/2020     Lab Results   Component Value Date    CHOLHDL 6.0 10/01/2020         Current Outpatient Medications:     aspirin (ECOTRIN) 325 MG EC tablet, Take 325 mg by mouth once daily., Disp: , Rfl:     atorvastatin (LIPITOR) 20 MG tablet, Take 1 tablet (20 mg total) by mouth once daily., Disp: 90 tablet, Rfl: 3    lisinopriL-hydrochlorothiazide (PRINZIDE,ZESTORETIC) 20-12.5 mg per tablet, Take 1 tablet by mouth once daily., Disp: 90 tablet, Rfl: 3    hydrOXYzine HCL (ATARAX) 25 MG tablet, Take 1 tablet (25 mg total) by mouth every 8 (eight) hours., Disp: 60 tablet, Rfl: 5    nitroGLYCERIN (NITROSTAT) 0.4 MG SL tablet, Place 1 tablet (0.4 mg total) under the tongue every 5 (five) minutes as needed for Chest pain., Disp: 25 tablet, Rfl: 3    sertraline (ZOLOFT) 100 MG tablet, Take 1 tablet (100 mg total) by mouth once daily., Disp: 30 tablet, Rfl: 1      Review of Systems   Respiratory:  Positive for shortness of breath.   "  Cardiovascular:  Positive for leg swelling. Negative for chest pain and palpitations.   Neurological:  Negative for loss of consciousness.           Objective:   /70 (BP Location: Left arm, Patient Position: Sitting)   Pulse 77   Ht 5' 8" (1.727 m)   Wt 86.5 kg (190 lb 12.8 oz)   SpO2 95%   BMI 29.01 kg/m²     Physical Exam  Vitals reviewed.   Constitutional:       General: He is not in acute distress.     Appearance: Normal appearance.   HENT:      Head: Normocephalic and atraumatic.   Neck:      Vascular: No carotid bruit or JVD.   Cardiovascular:      Rate and Rhythm: Normal rate and regular rhythm.      Pulses: Normal pulses.           Radial pulses are 2+ on the right side and 2+ on the left side.      Heart sounds: Normal heart sounds. No murmur heard.  Pulmonary:      Effort: Pulmonary effort is normal.      Breath sounds: Decreased breath sounds present.   Musculoskeletal:      Right lower leg: No edema.      Left lower leg: No edema.   Skin:     General: Skin is dry.   Neurological:      Mental Status: He is alert.           Assessment:     1. Coronary artery disease involving native coronary artery of native heart without angina pectoris      mild, nonobstructive      2. Paroxysmal atrial fibrillation  EKG 12-lead    EKG 12-lead      3. Hypertension, unspecified type  EKG 12-lead    EKG 12-lead      4. Chronic obstructive pulmonary disease, unspecified COPD type      still smokes            Plan:   Continue current medications  No desire to quit smoking  F/u in 6 months.             "

## 2025-01-21 LAB
OHS QRS DURATION: 96 MS
OHS QTC CALCULATION: 418 MS

## 2025-07-11 ENCOUNTER — OFFICE VISIT (OUTPATIENT)
Dept: CARDIOLOGY | Facility: CLINIC | Age: 75
End: 2025-07-11
Payer: MEDICARE

## 2025-07-11 VITALS
HEIGHT: 68 IN | SYSTOLIC BLOOD PRESSURE: 150 MMHG | WEIGHT: 177.81 LBS | OXYGEN SATURATION: 94 % | BODY MASS INDEX: 26.95 KG/M2 | HEART RATE: 86 BPM | DIASTOLIC BLOOD PRESSURE: 102 MMHG

## 2025-07-11 DIAGNOSIS — F17.200 SMOKER: Chronic | ICD-10-CM

## 2025-07-11 DIAGNOSIS — I48.0 PAROXYSMAL ATRIAL FIBRILLATION: Chronic | ICD-10-CM

## 2025-07-11 DIAGNOSIS — J44.9 CHRONIC OBSTRUCTIVE PULMONARY DISEASE, UNSPECIFIED COPD TYPE: Chronic | ICD-10-CM

## 2025-07-11 DIAGNOSIS — I25.10 CORONARY ARTERY DISEASE INVOLVING NATIVE CORONARY ARTERY OF NATIVE HEART WITHOUT ANGINA PECTORIS: Primary | Chronic | ICD-10-CM

## 2025-07-11 DIAGNOSIS — I10 HYPERTENSION, UNSPECIFIED TYPE: Chronic | ICD-10-CM

## 2025-07-11 PROCEDURE — 93010 ELECTROCARDIOGRAM REPORT: CPT | Mod: S$PBB,,, | Performed by: INTERNAL MEDICINE

## 2025-07-11 PROCEDURE — 99214 OFFICE O/P EST MOD 30 MIN: CPT | Mod: S$PBB,,, | Performed by: INTERNAL MEDICINE

## 2025-07-11 PROCEDURE — 99213 OFFICE O/P EST LOW 20 MIN: CPT | Mod: PBBFAC,25 | Performed by: INTERNAL MEDICINE

## 2025-07-11 PROCEDURE — 99999 PR PBB SHADOW E&M-EST. PATIENT-LVL III: CPT | Mod: PBBFAC,,, | Performed by: INTERNAL MEDICINE

## 2025-07-11 PROCEDURE — 93005 ELECTROCARDIOGRAM TRACING: CPT | Mod: PBBFAC | Performed by: INTERNAL MEDICINE

## 2025-07-14 LAB
OHS QRS DURATION: 92 MS
OHS QTC CALCULATION: 432 MS

## 2025-07-14 RX ORDER — HYDROXYZINE HYDROCHLORIDE 25 MG/1
25 TABLET, FILM COATED ORAL EVERY 8 HOURS
Qty: 60 TABLET | Refills: 5 | Status: SHIPPED | OUTPATIENT
Start: 2025-07-14

## 2025-07-14 RX ORDER — LISINOPRIL AND HYDROCHLOROTHIAZIDE 12.5; 2 MG/1; MG/1
1 TABLET ORAL DAILY
Qty: 90 TABLET | Refills: 3 | Status: SHIPPED | OUTPATIENT
Start: 2025-07-14 | End: 2026-07-14

## 2025-07-16 NOTE — PROGRESS NOTES
PCP: Marleny Yusuf II, MD    Referring Provider:     Subjective:   Guanako Spivey is a 74 y.o. male with hx of nonobstructive CAD, HTN, COPD, and paroxysmal afib (during [facility])  who presents for 6 month follow up.     Fhx:  Family History   Problem Relation Name Age of Onset    Stroke Mother      Heart disease Father      Tuberculosis Father      Heart attack Father       Shx: Social History[1]    EKG   7/11/25--NSR, left anterior fascicular block, minimal voltage criteria for LVH, junctional ST depression, 77 bpm  1/10/25--NSR, left anterior fascicular block, minimal criteria for LVH, ST and T wave abn, 68 bpm  6/20/24--NSR with sinus arrhythmia, right atrial enlargement, left axis deviation, ,minimal voltage criteria for LVH, 99 bpm  12/14/23--NSR, right atrial enlargement, left axis deviation, pulmonary disease pattern, minimal voltage criteria for LVH, nonspecific ST and T wave abn, 77 bpm  6/5/23--NSR, possible left atrial enlargement, left anterior fascicular block, junctional ST depression, 77 bpm  12/5/2022 RSR with HR 73 bpm  4/19/2022 NSR, right atrial enlargement, pulmonary disease pattern, left anterior fascicular block, 72 bpm     ECHO:  12/6/2018  Moderate sized inferoapical wall partially reperfusing defect suspicious for ischemia and probable scar  A subtle, moderate to large sized area of mid anterolateral wall demonstrates improvement in rest images and may represent an area of ischemia  Normal LV size and systolic function,EF 76%     Echocardiogram 11/26/2018  Mild concentric LVH with normal LV size and systolic function, EF 60%  Left ventricular diastolic dysfunction (impaired LV relaxation)  Mild mitral regurgitation with normal LA size  Aortic valve sclerosis w/o significant stenosis  Mild TR with o/w normal right heart chambers and valve        [facility]:  1/24/2019  Mild to moderate, nonobstructive CAD with a 60% mid LAD lesion  Normal LV size and systolic function, EF 65%  Mild MR  The patient  "was observed to have paroxysmal atrial fibrillation in the Cath lab. This may be another source for his chest discomfort.    Lab Results   Component Value Date    CHOL 179 07/11/2025    CHOL 175 10/01/2020     Lab Results   Component Value Date    HDL 32 (L) 07/11/2025    HDL 29 10/01/2020     Lab Results   Component Value Date    LDLCALC 115 07/11/2025    LDLCALC 108 10/01/2020     Lab Results   Component Value Date    TRIG 160 (H) 07/11/2025    TRIG 188 10/01/2020     Lab Results   Component Value Date    CHOLHDL 5.6 07/11/2025    CHOLHDL 6.0 10/01/2020     Current Medications[2]    Review of Systems   Respiratory:  Positive for shortness of breath.    Cardiovascular:  Negative for chest pain, palpitations and leg swelling.   Neurological:  Negative for loss of consciousness.       History of Present Illness    CHIEF COMPLAINT:  Patient presents today for follow up.    RESPIRATORY STATUS:  He is a current smoker with significantly reduced lung capacity, estimated at 1/4 of what it was at age 19. He requires oxygen therapy but continues to smoke despite medical advice against it.    HYPERTENSION:  He reports elevated BP today despite medication compliance with Lisinopril 10 mg taken this morning. He denies any recent changes in medication or lifestyle that might contribute to elevated readings.    CURRENT MEDICATIONS:  He currently takes Lisinopril and Hydroxyzine.    SOCIAL HISTORY:  He is a 74-year-old male who will be turning 75 in 2 weeks.    Objective:   BP (!) 150/102 (BP Location: Left arm, Patient Position: Sitting)   Pulse 86   Ht 5' 8" (1.727 m)   Wt 80.6 kg (177 lb 12.8 oz)   SpO2 (!) 94%   BMI 27.03 kg/m²       Physical Exam  Vitals reviewed.   Constitutional:       General: He is not in acute distress.     Appearance: Normal appearance.   HENT:      Head: Normocephalic and atraumatic.   Neck:      Vascular: No carotid bruit or JVD.   Cardiovascular:      Rate and Rhythm: Normal rate and regular " rhythm.      Pulses: Normal pulses.      Heart sounds: Normal heart sounds. No murmur heard.  Pulmonary:      Effort: Pulmonary effort is normal.      Breath sounds: Decreased breath sounds and wheezing present.      Comments: Decreased breath sounds bilaterally, sparse wheezes  Musculoskeletal:      Right lower leg: No edema.      Left lower leg: No edema.   Skin:     General: Skin is warm and dry.   Neurological:      Mental Status: He is alert.             Assessment:     1. Coronary artery disease involving native coronary artery of native heart without angina pectoris  Lipid Panel    EKG 12-lead    EKG 12-lead    mild, non obstructive      2. Paroxysmal atrial fibrillation        3. Chronic obstructive pulmonary disease, unspecified COPD type        4. Smoker        5. Hypertension, unspecified type  lisinopriL-hydrochlorothiazide (PRINZIDE,ZESTORETIC) 20-12.5 mg per tablet          Assessment & Plan    IMPRESSION:  - Blood pressure elevated.  - Lung function significantly reduced compared to youth.    HYPERTENSION:  - Continued Lisinopril, provided 90-day supply with 3 refills (1 year total).  - Ordered blood pressure recheck.    ANXIETY DISORDER:  - Continued Hydroxyzine, provided 90-day supply with 3 refills (1 year total).    CHRONIC OBSTRUCTIVE PULMONARY DISEASE:  - Refilled oxygen prescription.    HYPERLIPIDEMIA:  - Ordered cholesterol screening.    TOBACCO USE:  - Discussed health risks associated with continued smoking.    FOLLOW-UP:  - Follow up in 6 months.         Plan:   Recheck b/p (150/ 100)  Lipids today  Stop smoking, cessation discussed  Refill lisinopril and hydroxyzine  F/u in 6 months.       This note was generated with the assistance of ambient listening technology. Verbal consent was obtained by the patient and accompanying visitor(s) for the recording of patient appointment to facilitate this note. I attest to having reviewed and edited the generated note for accuracy, though some syntax  or spelling errors may persist. Please contact the author of this note for any clarification.             [1]   Social History  Socioeconomic History    Marital status:    Tobacco Use    Smoking status: Every Day     Current packs/day: 1.50     Average packs/day: 1.5 packs/day for 50.0 years (75.0 ttl pk-yrs)     Types: Cigarettes   Substance and Sexual Activity    Alcohol use: Never    Drug use: Never   [2]   Current Outpatient Medications:     aspirin (ECOTRIN) 325 MG EC tablet, Take 325 mg by mouth once daily., Disp: , Rfl:     nitroGLYCERIN (NITROSTAT) 0.4 MG SL tablet, Place 1 tablet (0.4 mg total) under the tongue every 5 (five) minutes as needed for Chest pain., Disp: 25 tablet, Rfl: 3    sertraline (ZOLOFT) 100 MG tablet, Take 1 tablet (100 mg total) by mouth once daily., Disp: 30 tablet, Rfl: 1    atorvastatin (LIPITOR) 20 MG tablet, Take 1 tablet (20 mg total) by mouth once daily., Disp: 90 tablet, Rfl: 3    hydrOXYzine HCL (ATARAX) 25 MG tablet, Take 1 tablet (25 mg total) by mouth every 8 (eight) hours., Disp: 60 tablet, Rfl: 5    lisinopriL-hydrochlorothiazide (PRINZIDE,ZESTORETIC) 20-12.5 mg per tablet, Take 1 tablet by mouth once daily., Disp: 90 tablet, Rfl: 3

## 2025-07-18 ENCOUNTER — TELEPHONE (OUTPATIENT)
Dept: CARDIOLOGY | Facility: CLINIC | Age: 75
End: 2025-07-18
Payer: MEDICARE

## 2025-07-18 RX ORDER — ATORVASTATIN CALCIUM 40 MG/1
40 TABLET, FILM COATED ORAL DAILY
Qty: 90 TABLET | Refills: 3 | Status: SHIPPED | OUTPATIENT
Start: 2025-07-18 | End: 2026-07-18

## 2025-07-18 NOTE — TELEPHONE ENCOUNTER
Placed call to patient no voicemail set up , patient has an increase in cholestrol medication and also needs a blood pressure check no voicemail set up